# Patient Record
Sex: FEMALE | Race: WHITE | Employment: OTHER | RURAL
[De-identification: names, ages, dates, MRNs, and addresses within clinical notes are randomized per-mention and may not be internally consistent; named-entity substitution may affect disease eponyms.]

---

## 2017-03-17 ENCOUNTER — OFFICE VISIT (OUTPATIENT)
Dept: FAMILY MEDICINE CLINIC | Age: 67
End: 2017-03-17

## 2017-03-17 VITALS
HEIGHT: 63 IN | WEIGHT: 130 LBS | HEART RATE: 91 BPM | BODY MASS INDEX: 23.04 KG/M2 | OXYGEN SATURATION: 99 % | TEMPERATURE: 97.7 F | RESPIRATION RATE: 18 BRPM | DIASTOLIC BLOOD PRESSURE: 70 MMHG | SYSTOLIC BLOOD PRESSURE: 130 MMHG

## 2017-03-17 DIAGNOSIS — C50.919 METASTATIC BREAST CANCER (HCC): Primary | ICD-10-CM

## 2017-03-17 DIAGNOSIS — G89.3 CHRONIC PAIN DUE TO NEOPLASM: ICD-10-CM

## 2017-03-17 RX ORDER — MIRTAZAPINE 15 MG/1
TABLET, FILM COATED ORAL
COMMUNITY
End: 2017-08-04 | Stop reason: ALTCHOICE

## 2017-03-17 RX ORDER — OXYCODONE HYDROCHLORIDE 10 MG/1
TABLET ORAL
COMMUNITY
End: 2017-03-17 | Stop reason: SDUPTHER

## 2017-03-17 RX ORDER — GABAPENTIN 600 MG/1
TABLET ORAL
Qty: 90 TAB | Refills: 5 | Status: SHIPPED | OUTPATIENT
Start: 2017-03-17 | End: 2017-06-01 | Stop reason: SDUPTHER

## 2017-03-17 RX ORDER — GLUCOSAMINE SULFATE 1500 MG
POWDER IN PACKET (EA) ORAL DAILY
COMMUNITY

## 2017-03-17 RX ORDER — CALCIUM CARBONATE 500(1250)
TABLET ORAL DAILY
COMMUNITY

## 2017-03-17 RX ORDER — NAPROXEN 500 MG/1
TABLET ORAL
Qty: 60 TAB | Refills: 5 | Status: SHIPPED | OUTPATIENT
Start: 2017-03-17 | End: 2017-08-04 | Stop reason: SINTOL

## 2017-03-17 RX ORDER — OXYCODONE HYDROCHLORIDE 10 MG/1
10 TABLET ORAL
Qty: 30 TAB | Refills: 0 | Status: SHIPPED | OUTPATIENT
Start: 2017-03-17 | End: 2017-06-01 | Stop reason: SDUPTHER

## 2017-03-17 NOTE — PROGRESS NOTES
Ramon Conner is a 78 y.o. female who presents with the following:  Chief Complaint   Patient presents with    Pain (Chronic)     needs pain management since breast surgery       Back Pain    The history is provided by the patient (The patient has metastatic breast cancer from the right breast and to her bones and so far has spared her soft tissues. She is out of oxycodone and needs some to bridge until she gets a prescription from her doctor in [place]). The pain is present in the thoracic spine and lumbar spine. The quality of the pain is described as stabbing, shooting and aching (Unremitting). The pain is at a severity of 7/10. The pain is the same all the time. Pertinent negatives include no chest pain, no fever, no numbness, no weight loss, no headaches, no abdominal pain, no abdominal swelling, no bowel incontinence, no perianal numbness, no bladder incontinence, no dysuria, no pelvic pain, no leg pain, no paresthesias, no paresis, no tingling and no weakness. No Known Allergies    Current Outpatient Prescriptions   Medication Sig    calcium carbonate (OS-ANIKET) 500 mg calcium (1,250 mg) tablet Take  by mouth daily.  cholecalciferol (VITAMIN D3) 1,000 unit cap Take  by mouth daily.  mirtazapine (REMERON) 15 mg tablet Take  by mouth nightly.  oxyCODONE IR (ROXICODONE) 10 mg tab immediate release tablet Take 1 Tab by mouth every six (6) hours as needed. Max Daily Amount: 40 mg.    naproxen (NAPROSYN) 500 mg tablet Twice daily with meals  Indications: Pain    gabapentin (NEURONTIN) 600 mg tablet By mouth every 8 hours for pain  Indications: NEUROPATHIC PAIN    citalopram (CELEXA) 20 mg tablet Take 1 Tab by mouth daily. Indications: ANXIETY WITH DEPRESSION    diazepam (VALIUM) 10 mg tablet One po qhs  Indications: MUSCLE SPASM    methocarbamol (ROBAXIN) 750 mg tablet Take 1 Tab by mouth four (4) times daily. Indications:  MUSCLE SPASM    METHYL SALICYLATE/MENTHOL (ICY HOT EX) by Apply Externally route as needed.  METHYL SALICYLATE/MENTHOL (BENGAY EX) by Apply Externally route as needed.  NAPROXEN SODIUM (ALEVE PO) Take  by mouth as needed.  traMADol (ULTRAM) 50 mg tablet Take 1 Tab by mouth every six (6) hours as needed for Pain. Max Daily Amount: 200 mg. Indications: PAIN     No current facility-administered medications for this visit. Past Medical History:   Diagnosis Date    Depression     Dizziness     Gastritis     Insomnia     Joint pain     Urinary frequency     Weakness     arms and legs       Past Surgical History:   Procedure Laterality Date    HX KYPHOPLASTY         Family History   Problem Relation Age of Onset    Hypertension Father     Stroke Father        Social History     Social History    Marital status:      Spouse name: N/A    Number of children: N/A    Years of education: N/A     Social History Main Topics    Smoking status: Current Every Day Smoker     Packs/day: 0.25     Types: Cigarettes    Smokeless tobacco: Never Used    Alcohol use 1.2 - 3.0 oz/week     2 - 5 Standard drinks or equivalent per week    Drug use: No      Comment: hx of \"crack\" use [id number]    Sexual activity: Not Asked     Other Topics Concern    None     Social History Narrative       Review of Systems   Constitutional: Negative for fever and weight loss. Cardiovascular: Negative for chest pain. Gastrointestinal: Negative for abdominal pain and bowel incontinence. Genitourinary: Negative for bladder incontinence, dysuria and pelvic pain. Musculoskeletal: Positive for back pain. Neurological: Negative for tingling, weakness, numbness, headaches and paresthesias.        Visit Vitals    /70 (BP 1 Location: Left arm, BP Patient Position: Sitting)    Pulse 91    Temp 97.7 °F (36.5 °C)    Resp 18    Ht 5' 2.5\" (1.588 m)    Wt 130 lb (59 kg)    SpO2 99%    BMI 23.4 kg/m2     Physical Exam   Constitutional: She is oriented to person, place, and time. She appears distressed. The patient looks absolutely miserable   HENT:   Head: Normocephalic and atraumatic. Right Ear: External ear normal.   Left Ear: External ear normal.   Mouth/Throat: Oropharynx is clear and moist.   Eyes: Conjunctivae and EOM are normal. Pupils are equal, round, and reactive to light. Right eye exhibits no discharge. Left eye exhibits no discharge. Neck: Normal range of motion. Neck supple. No tracheal deviation present. No thyromegaly present. Cardiovascular: Normal rate, regular rhythm, normal heart sounds and intact distal pulses. Exam reveals no gallop and no friction rub. No murmur heard. Pulmonary/Chest: Effort normal and breath sounds normal. No respiratory distress. She has no wheezes. She exhibits no tenderness. The right breast has been totally consumed by the cancer and a mass can be felt in the spinal column at the lower thoracic area and upper lumbar. Abdominal: Soft. Bowel sounds are normal. She exhibits no distension and no mass. There is no tenderness. There is no rebound and no guarding. Musculoskeletal: She exhibits no edema or tenderness. Lymphadenopathy:     She has no cervical adenopathy. Neurological: She is alert and oriented to person, place, and time. She has normal reflexes. No cranial nerve deficit. She exhibits normal muscle tone. Gait normal. Coordination normal.   Skin: Skin is warm and dry. No rash noted. No erythema. No pallor. Psychiatric: Mood, memory, affect and judgment normal.         ICD-10-CM ICD-9-CM    1.  Metastatic breast cancer (HCC) C50.919 174.9 calcium carbonate (OS-ANIKET) 500 mg calcium (1,250 mg) tablet    C79.9 199.1 cholecalciferol (VITAMIN D3) 1,000 unit cap      mirtazapine (REMERON) 15 mg tablet      oxyCODONE IR (ROXICODONE) 10 mg tab immediate release tablet      naproxen (NAPROSYN) 500 mg tablet      gabapentin (NEURONTIN) 600 mg tablet      DISCONTINUED: oxyCODONE IR (ROXICODONE) 10 mg tab immediate release tablet   2. Chronic pain due to neoplasm G89.3 338.3 calcium carbonate (OS-ANIKET) 500 mg calcium (1,250 mg) tablet      cholecalciferol (VITAMIN D3) 1,000 unit cap      mirtazapine (REMERON) 15 mg tablet      oxyCODONE IR (ROXICODONE) 10 mg tab immediate release tablet      naproxen (NAPROSYN) 500 mg tablet      gabapentin (NEURONTIN) 600 mg tablet      DISCONTINUED: oxyCODONE IR (ROXICODONE) 10 mg tab immediate release tablet    the patient understands the oxycodone prescription is just to get her through until the permanent prescription arrives from her cancer doctor in [place]. I am absolutely surprised this lady is still alive considering the bulk of the original cancer diagnosis. I believe this patient is a relatively low risk for use of the oxycodone as she may use it every 6 hours but has been spacing it out to every 8 hours and is been without it for over a week. I have talked to her about the use of Naprosyn and gabapentin and how to gradually build up on the gabapentin to where it is not making her sleepy and to watch for gastrointestinal problems with the Naprosyn.  was checked. Orders Placed This Encounter    calcium carbonate (OS-ANIKET) 500 mg calcium (1,250 mg) tablet     Sig: Take  by mouth daily.  cholecalciferol (VITAMIN D3) 1,000 unit cap     Sig: Take  by mouth daily.  DISCONTD: oxyCODONE IR (ROXICODONE) 10 mg tab immediate release tablet     Sig: Take  by mouth.  mirtazapine (REMERON) 15 mg tablet     Sig: Take  by mouth nightly.  oxyCODONE IR (ROXICODONE) 10 mg tab immediate release tablet     Sig: Take 1 Tab by mouth every six (6) hours as needed. Max Daily Amount: 40 mg.      Dispense:  30 Tab     Refill:  0    naproxen (NAPROSYN) 500 mg tablet     Sig: Twice daily with meals  Indications: Pain     Dispense:  60 Tab     Refill:  5    gabapentin (NEURONTIN) 600 mg tablet     Sig: By mouth every 8 hours for pain  Indications: NEUROPATHIC PAIN     Dispense:  90 Tab Refill:  5       Follow-up Disposition: Not on Shaye Feliz MD

## 2017-03-17 NOTE — MR AVS SNAPSHOT
Visit Information Date & Time Provider Department Dept. Phone Encounter #  
 3/17/2017  9:20 AM Jonathon Remy MD [facility] [phone] [id number] Upcoming Health Maintenance Date Due Hepatitis C Screening 1950 DTaP/Tdap/Td series (1 - Tdap) 11/16/1970 FOBT Q 1 YEAR AGE 50-75 11/16/1999 ZOSTER VACCINE AGE 60> 11/16/2009 GLAUCOMA SCREENING Q2Y 11/16/2014 OSTEOPOROSIS SCREENING (DEXA) 11/16/2014 Pneumococcal 65+ High/Highest Risk (1 of 2 - PCV13) 11/16/2014 MEDICARE YEARLY EXAM 11/16/2014 INFLUENZA AGE 9 TO ADULT 8/1/2016 BREAST CANCER SCRN MAMMOGRAM 3/30/2018 Allergies as of 3/17/2017  Review Complete On: 3/17/2017 By: Jonathon Remy MD  
 No Known Allergies Current Immunizations  Never Reviewed No immunizations on file. Not reviewed this visit You Were Diagnosed With   
  
 Codes Comments [facility]    -  Primary ICD-10-CM: C50.919, C79.9 ICD-9-CM: 174.9, 199.1 Chronic pain due to neoplasm     ICD-10-CM: G89.3 ICD-9-CM: 338. 3 Vitals BP Pulse Temp Resp Height(growth percentile) Weight(growth percentile) 130/70 (BP 1 Location: Left arm, BP Patient Position: Sitting) 91 97.7 °F (36.5 °C) 18 5' 2.5\" (1.588 m) 130 lb (59 kg) SpO2 BMI OB Status Smoking Status 99% 23.4 kg/m2 Postmenopausal Current Every Day Smoker Vitals History BMI and BSA Data Body Mass Index Body Surface Area  
 23.4 kg/m 2 1.61 m 2 Preferred Pharmacy Pharmacy Name Phone [facility] [place] [address], [place] - [address] [phone] Your Updated Medication List  
  
   
This list is accurate as of: 3/17/17 11:26 AM.  Always use your most recent med list.  
  
  
  
  
 Zygmunt Barreto Take  by mouth as needed. calcium carbonate 500 mg calcium (1,250 mg) tablet Commonly known as:  OS-ANIKET Take  by mouth daily. citalopram 20 mg tablet Commonly known as:  Lovetta Rape Take 1 Tab by mouth daily. Indications: ANXIETY WITH DEPRESSION  
  
 diazePAM 10 mg tablet Commonly known as:  VALIUM One po qhs  Indications: MUSCLE SPASM  
  
 gabapentin 600 mg tablet Commonly known as:  NEURONTIN By mouth every 8 hours for pain  Indications: NEUROPATHIC PAIN  
  
 * ICY HOT EX  
by Apply Externally route as needed. * BENGAY EX  
by Apply Externally route as needed. methocarbamol 750 mg tablet Commonly known as:  ROBAXIN Take 1 Tab by mouth four (4) times daily. Indications: MUSCLE SPASM  
  
 mirtazapine 15 mg tablet Commonly known as:  [place] Hoop Take  by mouth nightly. naproxen 500 mg tablet Commonly known as:  NAPROSYN Twice daily with meals  Indications: Pain  
  
 oxyCODONE IR 10 mg Tab immediate release tablet Commonly known as:  [place] Take 1 Tab by mouth every six (6) hours as needed. Max Daily Amount: 40 mg.  
  
 traMADol 50 mg tablet Commonly known as:  ULTRAM  
Take 1 Tab by mouth every six (6) hours as needed for Pain. Max Daily Amount: 200 mg. Indications: PAIN  
  
 VITAMIN D3 1,000 unit Cap Generic drug:  cholecalciferol Take  by mouth daily. * Notice: This list has 2 medication(s) that are the same as other medications prescribed for you. Read the directions carefully, and ask your doctor or other care provider to review them with you. Prescriptions Printed Refills  
 oxyCODONE IR (ROXICODONE) 10 mg tab immediate release tablet 0 Sig: Take 1 Tab by mouth every six (6) hours as needed. Max Daily Amount: 40 mg.  
 Class: Print Route: Oral  
  
Prescriptions Sent to Pharmacy Refills  
 naproxen (NAPROSYN) 500 mg tablet 5 Sig: Twice daily with meals  Indications: Pain  Class: Normal  
 Pharmacy: [address] Ph #: [phone]  
 gabapentin (NEURONTIN) 600 mg tablet 5  
 Sig: By mouth every 8 hours for pain  Indications: NEUROPATHIC PAIN Class: Normal  
 Pharmacy: [facility] [address]  #: [phone] Introducing [facility] & HEALTH SERVICES! Annmarie Ward introduces ENDOGENX patient portal. Now you can access parts of your medical record, email your doctor's office, and request medication refills online. 1. In your internet browser, go to [email or web address] 2. Click on the First Time User? Click Here link in the Sign In box. You will see the New Member Sign Up page. 3. Enter your ENDOGENX Access Code exactly as it appears below. You will not need to use this code after youve completed the sign-up process. If you do not sign up before the expiration date, you must request a new code. · ENDOGENX Access Code: [email or web address] Expires: 6/15/2017 11:26 AM 
 
4. Enter the last four digits of your Social Security Number (xxxx) and Date of Birth (mm/dd/yyyy) as indicated and click Submit. You will be taken to the next sign-up page. 5. Create a ENDOGENX ID. This will be your ENDOGENX login ID and cannot be changed, so think of one that is secure and easy to remember. 6. Create a ENDOGENX password. You can change your password at any time. 7. Enter your Password Reset Question and Answer. This can be used at a later time if you forget your password. 8. Enter your e-mail address. You will receive e-mail notification when new information is available in [address] 9. Click Sign Up. You can now view and download portions of your medical record. 10. Click the Download Summary menu link to download a portable copy of your medical information. If you have questions, please visit the Frequently Asked Questions section of the ENDOGENX website. Remember, ENDOGENX is NOT to be used for urgent needs. For medical emergencies, dial 911. Now available from your iPhone and Android! Please provide this summary of care documentation to your next provider. Your primary care clinician is listed as Janel Mays. If you have any questions after today's visit, please call [phone].

## 2017-03-17 NOTE — PROGRESS NOTES
Prescriptions cancelled at [address] [facility] and called into [facility] per patient request.

## 2017-04-27 ENCOUNTER — TELEPHONE (OUTPATIENT)
Dept: FAMILY MEDICINE CLINIC | Age: 67
End: 2017-04-27

## 2017-05-30 DIAGNOSIS — G89.3 CHRONIC PAIN DUE TO NEOPLASM: ICD-10-CM

## 2017-05-30 DIAGNOSIS — C50.919 METASTATIC BREAST CANCER (HCC): ICD-10-CM

## 2017-05-30 RX ORDER — OXYCODONE HYDROCHLORIDE 10 MG/1
10 TABLET ORAL
Qty: 30 TAB | Refills: 0 | OUTPATIENT
Start: 2017-05-30

## 2017-05-30 NOTE — TELEPHONE ENCOUNTER
Put has been unable to go back to [facility] because she was sick, has not set up a new appt with them yet. Wants a refill on oxycodone.

## 2017-06-01 ENCOUNTER — OFFICE VISIT (OUTPATIENT)
Dept: FAMILY MEDICINE CLINIC | Age: 67
End: 2017-06-01

## 2017-06-01 VITALS
DIASTOLIC BLOOD PRESSURE: 94 MMHG | HEART RATE: 94 BPM | BODY MASS INDEX: 23.39 KG/M2 | WEIGHT: 132 LBS | SYSTOLIC BLOOD PRESSURE: 152 MMHG | TEMPERATURE: 97.6 F | OXYGEN SATURATION: 98 % | RESPIRATION RATE: 18 BRPM | HEIGHT: 63 IN

## 2017-06-01 DIAGNOSIS — C50.919 METASTATIC BREAST CANCER (HCC): ICD-10-CM

## 2017-06-01 DIAGNOSIS — Z00.00 ROUTINE GENERAL MEDICAL EXAMINATION AT A HEALTH CARE FACILITY: Primary | ICD-10-CM

## 2017-06-01 DIAGNOSIS — F32.A ANXIETY AND DEPRESSION: ICD-10-CM

## 2017-06-01 DIAGNOSIS — F41.9 ANXIETY AND DEPRESSION: ICD-10-CM

## 2017-06-01 DIAGNOSIS — G89.3 CHRONIC PAIN DUE TO NEOPLASM: ICD-10-CM

## 2017-06-01 DIAGNOSIS — Z13.39 SCREENING FOR ALCOHOLISM: ICD-10-CM

## 2017-06-01 RX ORDER — GABAPENTIN 600 MG/1
TABLET ORAL
Qty: 90 TAB | Refills: 5 | Status: SHIPPED | OUTPATIENT
Start: 2017-06-01 | End: 2018-06-01 | Stop reason: SDUPTHER

## 2017-06-01 RX ORDER — LETROZOLE 2.5 MG/1
2.5 TABLET, FILM COATED ORAL DAILY
COMMUNITY

## 2017-06-01 RX ORDER — OXYCODONE HYDROCHLORIDE 10 MG/1
10 TABLET ORAL
Qty: 60 TAB | Refills: 0 | Status: SHIPPED | OUTPATIENT
Start: 2017-06-01 | End: 2017-06-30 | Stop reason: SDUPTHER

## 2017-06-01 NOTE — ACP (ADVANCE CARE PLANNING)
Advance Care Planning 6/1/2017   Confirm Advance Directive None   Patient Would Like to Complete Advance Directive Yes     Discussed ACP planning with patient and asked that they take form and info home and return with completed form at next visit. Patient states understanding and form with Q and A booklet given to patient.

## 2017-06-01 NOTE — MR AVS SNAPSHOT
Visit Information Date & Time Provider Department Dept. Phone Encounter #  
 6/1/2017 11:20 AM Maryann Watts MD [facility] [phone] [id number] Upcoming Health Maintenance Date Due Hepatitis C Screening 1950 FOBT Q 1 YEAR AGE 50-75 11/16/1999 GLAUCOMA SCREENING Q2Y 11/16/2014 Pneumococcal 65+ High/Highest Risk (1 of 2 - PCV13) 11/16/2014 INFLUENZA AGE 9 TO ADULT 8/1/2017 BREAST CANCER SCRN MAMMOGRAM 3/30/2018 MEDICARE YEARLY EXAM 6/2/2018 DTaP/Tdap/Td series (2 - Td) 6/1/2027 Allergies as of 6/1/2017  Review Complete On: 6/1/2017 By: Maryann Watts MD  
 No Known Allergies Current Immunizations  Never Reviewed No immunizations on file. Not reviewed this visit You Were Diagnosed With   
  
 Codes Comments Routine general medical examination at a health care facility     ICD-10-CM: Z00.00 ICD-9-CM: V70.0 Screening for alcoholism     ICD-10-CM: Z13.89 ICD-9-CM: V79.1 Metastatic breast cancer ([facility]     ICD-10-CM: C50.919, C79.9 ICD-9-CM: 174.9, 199.1 Chronic pain due to neoplasm     ICD-10-CM: G89.3 ICD-9-CM: 338. 3 Vitals BP Pulse Temp Resp Height(growth percentile) Weight(growth percentile) (!) 152/94 (BP 1 Location: Left arm, BP Patient Position: Sitting) 94 97.6 °F (36.4 °C) (Oral) 18 5' 2.5\" (1.588 m) 132 lb (59.9 kg) SpO2 BMI OB Status Smoking Status 98% 23.76 kg/m2 Postmenopausal Current Every Day Smoker Vitals History BMI and BSA Data Body Mass Index Body Surface Area  
 23.76 kg/m 2 1.63 m 2 Preferred Pharmacy Pharmacy Name Phone [address] AT [facility]  [address] [place] [phone] Your Updated Medication List  
  
   
This list is accurate as of: 6/1/17 12:31 PM.  Always use your most recent med list.  
  
  
  
  
 [place]is Rock Take  by mouth as needed. calcium carbonate 500 mg calcium (1,250 mg) tablet Commonly known as:  OS-ANIKET Take  by mouth daily. gabapentin 600 mg tablet Commonly known as:  NEURONTIN By mouth every 8 hours for pain  Indications: NEUROPATHIC PAIN  
  
 mirtazapine 15 mg tablet Commonly known as:  Agueda Ken Take  by mouth nightly. naproxen 500 mg tablet Commonly known as:  NAPROSYN Twice daily with meals  Indications: Pain  
  
 oxyCODONE IR 10 mg Tab immediate release tablet Commonly known as:  Yuli Neighbor Take 1 Tab by mouth every six (6) hours as needed. Max Daily Amount: 40 mg. VITAMIN D3 1,000 unit Cap Generic drug:  cholecalciferol Take  by mouth daily. Prescriptions Printed Refills  
 oxyCODONE IR (ROXICODONE) 10 mg tab immediate release tablet 0 Sig: Take 1 Tab by mouth every six (6) hours as needed. Max Daily Amount: 40 mg.  
 Class: Print Route: Oral  
  
Prescriptions Sent to Pharmacy Refills  
 gabapentin (NEURONTIN) 600 mg tablet 5 Sig: By mouth every 8 hours for pain  Indications: NEUROPATHIC PAIN Class: Normal  
 [facility] [place] [address] Ph #: [phone] We Performed the Following REFERRAL TO OPHTHALMOLOGY [REF57 Custom] Referral Information Referral ID Referred By Referred To  
  
 [id number] Delmi Vee, [facility], [facility]   
   [id number] Visits Status Start Date End Date 1 New Request 6/1/17 6/1/18 If your referral has a status of pending review or denied, additional information will be sent to support the outcome of this decision. Introducing [facility] & HEALTH SERVICES! [facility] introduces [employer] patient portal. Now you can access parts of your medical record, email your doctor's office, and request medication refills online.    
 
1. In your internet browser, go to [email or web address] 2. Click on the First Time User? Click Here link in the Sign In box. You will see the New Member Sign Up page. 3. Enter your Myows Access Code exactly as it appears below. You will not need to use this code after youve completed the sign-up process. If you do not sign up before the expiration date, you must request a new code. · Myows Access Code: [email or web address] Expires: 6/15/2017 11:26 AM 
 
4. Enter the last four digits of your Social Security Number (xxxx) and Date of Birth (mm/dd/yyyy) as indicated and click Submit. You will be taken to the next sign-up page. 5. Create a Myows ID. This will be your Myows login ID and cannot be changed, so think of one that is secure and easy to remember. 6. Create a Myows password. You can change your password at any time. 7. Enter your Password Reset Question and Answer. This can be used at a later time if you forget your password. 8. Enter your e-mail address. You will receive e-mail notification when new information is available in [address] 9. Click Sign Up. You can now view and download portions of your medical record. 10. Click the Download Summary menu link to download a portable copy of your medical information. If you have questions, please visit the Frequently Asked Questions section of the Myows website. Remember, Myows is NOT to be used for urgent needs. For medical emergencies, dial 911. Now available from your iPhone and Android! Please provide this summary of care documentation to your next provider. Your primary care clinician is listed as Adrian Shepard. If you have any questions after today's visit, please call [phone].

## 2017-06-02 ENCOUNTER — DOCUMENTATION ONLY (OUTPATIENT)
Dept: FAMILY MEDICINE CLINIC | Age: 67
End: 2017-06-02

## 2017-06-30 DIAGNOSIS — G89.3 CHRONIC PAIN DUE TO NEOPLASM: ICD-10-CM

## 2017-06-30 DIAGNOSIS — C50.919 METASTATIC BREAST CANCER (HCC): ICD-10-CM

## 2017-06-30 RX ORDER — OXYCODONE HYDROCHLORIDE 10 MG/1
10 TABLET ORAL
Qty: 60 TAB | Refills: 0 | Status: SHIPPED | OUTPATIENT
Start: 2017-06-30 | End: 2017-08-04 | Stop reason: SDUPTHER

## 2017-08-04 ENCOUNTER — OFFICE VISIT (OUTPATIENT)
Dept: FAMILY MEDICINE CLINIC | Age: 67
End: 2017-08-04

## 2017-08-04 VITALS
WEIGHT: 144 LBS | HEIGHT: 63 IN | BODY MASS INDEX: 25.52 KG/M2 | HEART RATE: 70 BPM | TEMPERATURE: 96.8 F | OXYGEN SATURATION: 98 % | DIASTOLIC BLOOD PRESSURE: 68 MMHG | SYSTOLIC BLOOD PRESSURE: 153 MMHG

## 2017-08-04 DIAGNOSIS — C50.919 METASTATIC BREAST CANCER (HCC): ICD-10-CM

## 2017-08-04 DIAGNOSIS — G89.3 CHRONIC PAIN DUE TO NEOPLASM: ICD-10-CM

## 2017-08-04 RX ORDER — OXYCODONE HYDROCHLORIDE 10 MG/1
10 TABLET ORAL
Qty: 60 TAB | Refills: 0 | Status: SHIPPED | OUTPATIENT
Start: 2017-08-04

## 2017-08-04 NOTE — MR AVS SNAPSHOT
Visit Information Date & Time Provider Department Dept. Phone Encounter #  
 8/4/2017  2:20 PM Mehdi Rojas MD [facility] BEHAVIORAL MEDICINE Primary Care [phone] [id number] Your Appointments 9/6/2017  9:20 AM  
Follow Up with Mehdi Rojas MD  
[facility] BE[facility]AVIORAL MEDICINE Primary Care [address] [facility]) Appt Note: 3 mo f/u  
 [address] [facility] [address] [phone]  
  
   
 [facility] 67 [phone] Upcoming Health Maintenance Date Due Hepatitis C Screening 1950 FOBT Q 1 YEAR AGE 50-75 11/16/1999 GLAUCOMA SCREENING Q2Y 11/16/2014 Pneumococcal 65+ High/Highest Risk (1 of 2 - PCV13) 11/16/2014 INFLUENZA AGE 9 TO ADULT 8/1/2017 BREAST CANCER SCRN MAMMOGRAM 3/30/2018 MEDICARE YEARLY EXAM 6/2/2018 DTaP/Tdap/Td series (2 - Td) 6/1/2027 Allergies as of 8/4/2017  Review Complete On: 8/4/2017 By: Mehdi Rojas MD  
 No Known Allergies Current Immunizations  Never Reviewed No immunizations on file. Not reviewed this visit You Were Diagnosed With   
  
 Codes Comments Metastatic breast cancer ([facility] [address]     ICD-10-CM: C50.919, C79.9 ICD-9-CM: 174.9, 199.1 Chronic pain due to neoplasm     ICD-10-CM: G89.3 ICD-9-CM: 338. 3 Vitals BP Pulse Temp Height(growth percentile) Weight(growth percentile) SpO2  
 153/68 (BP 1 Location: Left arm, BP Patient Position: Sitting) 70 96.8 °F (36 °C) (Oral) 5' 2.5\" (1.588 m) 144 lb (65.3 kg) 98% BMI OB Status Smoking Status 25.92 kg/m2 Postmenopausal Current Every Day Smoker Vitals History BMI and BSA Data Body Mass Index Body Surface Area  
 25.92 kg/m 2 1.7 m 2 Preferred Pharmacy Pharmacy Name Phone [address], [address] AT [facility] OF  [address] [phone] Your Updated Medication List  
  
   
 This list is accurate as of: 8/4/17  2:29 PM.  Always use your most recent med list.  
  
  
  
  
 Sole Mckeon Take  by mouth as needed. calcium carbonate 500 mg calcium (1,250 mg) tablet Commonly known as:  OS-ANIKET Take  by mouth daily. gabapentin 600 mg tablet Commonly known as:  NEURONTIN By mouth every 8 hours for pain  Indications: NEUROPATHIC PAIN  
  
 IBRANCE 125 mg Cap Generic drug:  palbociclib Take 125 mg by mouth. Indications: Take one everyday for 21 days then off for 7 days  
  
 letrozole 2.5 mg tablet Commonly known as:  [facility] Take 2.5 mg by mouth daily. oxyCODONE IR 10 mg Tab immediate release tablet Commonly known as:  Joana Parody Take 1 Tab by mouth every six (6) hours as needed. Max Daily Amount: 40 mg. VITAMIN D3 1,000 unit Cap Generic drug:  cholecalciferol Take  by mouth daily. Prescriptions Printed Refills  
 oxyCODONE IR (ROXICODONE) 10 mg tab immediate release tablet 0 Sig: Take 1 Tab by mouth every six (6) hours as needed. Max Daily Amount: 40 mg.  
 Class: Print Route: Oral  
  
Introducing [address]! [employer] introduces [employer] patient portal. Now you can access parts of your medical record, email your doctor's office, and request medication refills online. 1. In your internet browser, go to [email or web address] 2. Click on the First Time User? Click Here link in the Sign In box. You will see the New Member Sign Up page. 3. Enter your [employer]icket[employer] Access Code exactly as it appears below. You will not need to use this code after youve completed the sign-up process. If you do not sign up before the expiration date, you must request a new code. · [employer]icketForEvent Access Code: [email or web address] Expires: 11/2/2017  2:06 PM 
 
4. Enter the last four digits of your Social Security Number (xxxx) and Date of Birth (mm/dd/yyyy) as indicated and click Submit.  You will be taken to the next sign-up page. 5. Create a [employer] ID. This will be your [employer] login ID and cannot be changed, so think of one that is secure and easy to remember. 6. Create a [employer] password. You can change your password at any time. 7. Enter your Password Reset Question and Answer. This can be used at a later time if you forget your password. 8. Enter your e-mail address. You will receive e-mail notification when new information is available in [address] 9. Click Sign Up. You can now view and download portions of your medical record. 10. Click the Download Summary menu link to download a portable copy of your medical information. If you have questions, please visit the Frequently Asked Questions section of the [employer] website. Remember, [employer] is NOT to be used for urgent needs. For medical emergencies, dial 911. Now available from your iPhone and Android! Please provide this summary of care documentation to your next provider. Your primary care clinician is listed as Kim Bingham. If you have any questions after today's visit, please call [phone].

## 2017-08-04 NOTE — PROGRESS NOTES
Jonas Draper is a 78 y.o. female who presents with the following:  Chief Complaint   Patient presents with    Pain (Chronic)     breast cancer       Side Pain   The history is provided by the patient (Patient with persisting pain from her metastatic breast cancer needs a refill of her oxycodone currently averaging 2 per day without any constipation and is staying physically active but complains of gaining weight at this point in time. ). Pertinent negatives include no chest pain, no abdominal pain, no headaches and no shortness of breath. Hypertension    The history is provided by the patient (The patient's blood pressure is tended to go up with her weight gain and we have discussed sodium restriction and trying to drop a little bit of the weight to get her pressure down. ). Pertinent negatives include no chest pain, no orthopnea, no palpitations, no PND, no blurred vision, no headaches, no peripheral edema, no dizziness and no shortness of breath. No Known Allergies    Current Outpatient Prescriptions   Medication Sig    oxyCODONE IR (ROXICODONE) 10 mg tab immediate release tablet Take 1 Tab by mouth every six (6) hours as needed. Max Daily Amount: 40 mg.    gabapentin (NEURONTIN) 600 mg tablet By mouth every 8 hours for pain  Indications: NEUROPATHIC PAIN    letrozole (FEMARA) 2.5 mg tablet Take 2.5 mg by mouth daily.  palbociclib (IBRANCE) 125 mg cap Take 125 mg by mouth. Indications: Take one everyday for 21 days then off for 7 days    calcium carbonate (OS-ANIKET) 500 mg calcium (1,250 mg) tablet Take  by mouth daily.  cholecalciferol (VITAMIN D3) 1,000 unit cap Take  by mouth daily.  NAPROXEN SODIUM (ALEVE PO) Take  by mouth as needed. No current facility-administered medications for this visit.         Past Medical History:   Diagnosis Date    Depression     Dizziness     Gastritis     Insomnia     Joint pain     Urinary frequency     Weakness     arms and legs       Past Surgical History:   Procedure Laterality Date    HX KYPHOPLASTY         Family History   Problem Relation Age of Onset    Hypertension Father     Stroke Father        Social History     Social History    Marital status:      Spouse name: N/A    Number of children: N/A    Years of education: N/A     Social History Main Topics    Smoking status: Current Every Day Smoker     Packs/day: 0.25     Types: Cigarettes    Smokeless tobacco: Never Used    Alcohol use 1.2 - 3.0 oz/week     2 - 5 Standard drinks or equivalent per week    Drug use: No      Comment: hx of \"crack\" use [id number]    Sexual activity: Not Asked     Other Topics Concern    None     Social History Narrative       Review of Systems   Eyes: Negative for blurred vision. Respiratory: Negative for shortness of breath. Cardiovascular: Negative for chest pain, palpitations, orthopnea and PND. Gastrointestinal: Negative for abdominal pain. Neurological: Negative for dizziness and headaches. Visit Vitals    /68 (BP 1 Location: Left arm, BP Patient Position: Sitting)    Pulse 70    Temp 96.8 °F (36 °C) (Oral)    Ht 5' 2.5\" (1.588 m)    Wt 144 lb (65.3 kg)    SpO2 98%    BMI 25.92 kg/m2     Physical Exam   Constitutional: She is oriented to person, place, and time and well-developed, well-nourished, and in no distress. HENT:   Head: Normocephalic and atraumatic. Right Ear: External ear normal.   Left Ear: External ear normal.   Mouth/Throat: Oropharynx is clear and moist.   Eyes: Conjunctivae and EOM are normal. Pupils are equal, round, and reactive to light. Right eye exhibits no discharge. Left eye exhibits no discharge. Neck: Normal range of motion. Neck supple. No tracheal deviation present. No thyromegaly present. Cardiovascular: Normal rate, regular rhythm, normal heart sounds and intact distal pulses. Exam reveals no gallop and no friction rub. No murmur heard.   Pulmonary/Chest: Effort normal and breath sounds normal. No respiratory distress. She has no wheezes. She exhibits no tenderness. The breast cancer in the right breast is pretty much the same right now but not tender and not ulcerating. Abdominal: Soft. Bowel sounds are normal. She exhibits no distension and no mass. There is no tenderness. There is no rebound and no guarding. Musculoskeletal: She exhibits no edema or tenderness. Lymphadenopathy:     She has no cervical adenopathy. Neurological: She is alert and oriented to person, place, and time. She has normal reflexes. No cranial nerve deficit. She exhibits normal muscle tone. Gait normal. Coordination normal.   Skin: Skin is warm and dry. No rash noted. No erythema. No pallor. Psychiatric: Mood, memory, affect and judgment normal.         ICD-10-CM ICD-9-CM    1. Metastatic breast cancer (HCC) C50.919 174.9 oxyCODONE IR (ROXICODONE) 10 mg tab immediate release tablet    C79.9 199.1    2. Chronic pain due to neoplasm G89.3 338.3 oxyCODONE IR (ROXICODONE) 10 mg tab immediate release tablet       Orders Placed This Encounter    oxyCODONE IR (ROXICODONE) 10 mg tab immediate release tablet     Sig: Take 1 Tab by mouth every six (6) hours as needed. Max Daily Amount: 40 mg.      Dispense:  60 Tab     Refill:  0       Follow-up Disposition: Not on Prudy MD Ashok

## 2017-09-05 ENCOUNTER — TELEPHONE (OUTPATIENT)
Dept: FAMILY MEDICINE CLINIC | Age: 67
End: 2017-09-05

## 2017-09-05 NOTE — TELEPHONE ENCOUNTER
Patient called she is seeing her Oncologist at [facility] right now and Dr. Francisco Javier Husainpool and she told the patient that if ok with her PCP she would write the rx for oxycodone while she is in the office. Spoke with Dr. Enrique Downey and he stated that was fine, called pt back and made her aware.

## 2017-09-26 ENCOUNTER — TELEPHONE (OUTPATIENT)
Dept: FAMILY MEDICINE CLINIC | Age: 67
End: 2017-09-26

## 2017-09-26 NOTE — TELEPHONE ENCOUNTER
That is okay with me but asked the patient to have Dr. Jasson Thomas give us a note that she filled it.

## 2017-09-26 NOTE — TELEPHONE ENCOUNTER
Pt is having issues with transportation. She went to [place] yesterday and had bone scan done. She is to have her CT on 10/10. Patient has appointment with her Oncologist (Dr. Aubrie Oviedo) on 10/03 and would like to know if she can refill her Oxycodone then. Dr. Dione Nettles filled it for her last month.

## 2018-06-01 DIAGNOSIS — C50.919 METASTATIC BREAST CANCER (HCC): ICD-10-CM

## 2018-06-01 DIAGNOSIS — G89.3 CHRONIC PAIN DUE TO NEOPLASM: ICD-10-CM

## 2018-06-01 RX ORDER — GABAPENTIN 600 MG/1
TABLET ORAL
Qty: 90 TAB | Refills: 0 | Status: SHIPPED | OUTPATIENT
Start: 2018-06-01 | End: 2018-07-01 | Stop reason: SDUPTHER

## 2018-07-01 DIAGNOSIS — G89.3 CHRONIC PAIN DUE TO NEOPLASM: ICD-10-CM

## 2018-07-01 DIAGNOSIS — C50.919 METASTATIC BREAST CANCER (HCC): ICD-10-CM

## 2018-07-02 RX ORDER — GABAPENTIN 600 MG/1
TABLET ORAL
Qty: 90 TAB | Refills: 0 | Status: SHIPPED | OUTPATIENT
Start: 2018-07-02 | End: 2018-07-29 | Stop reason: SDUPTHER

## 2018-07-02 NOTE — TELEPHONE ENCOUNTER
Spoke with patient after verifying Name, and , patient aware of prescription refill and needing to schedule appointment. Patient scheduling appointment at this time.

## 2018-07-02 NOTE — TELEPHONE ENCOUNTER
Please call Ms. and uterus and asked her to make an appointment to be seen since it has been about a year and she is requesting refills which I will do for 1 month.

## 2018-07-29 DIAGNOSIS — G89.3 CHRONIC PAIN DUE TO NEOPLASM: ICD-10-CM

## 2018-07-29 DIAGNOSIS — C50.919 METASTATIC BREAST CANCER (HCC): ICD-10-CM

## 2018-07-30 RX ORDER — GABAPENTIN 600 MG/1
TABLET ORAL
Qty: 90 TAB | Refills: 0 | Status: SHIPPED | OUTPATIENT
Start: 2018-07-30 | End: 2018-08-26 | Stop reason: SDUPTHER

## 2018-08-26 DIAGNOSIS — C50.919 METASTATIC BREAST CANCER (HCC): ICD-10-CM

## 2018-08-26 DIAGNOSIS — G89.3 CHRONIC PAIN DUE TO NEOPLASM: ICD-10-CM

## 2018-08-28 RX ORDER — GABAPENTIN 600 MG/1
TABLET ORAL
Qty: 90 TAB | Refills: 0 | Status: SHIPPED | OUTPATIENT
Start: 2018-08-28 | End: 2018-09-27 | Stop reason: SDUPTHER

## 2018-09-27 DIAGNOSIS — G89.3 CHRONIC PAIN DUE TO NEOPLASM: ICD-10-CM

## 2018-09-27 DIAGNOSIS — C50.919 METASTATIC BREAST CANCER (HCC): ICD-10-CM

## 2018-09-27 RX ORDER — GABAPENTIN 600 MG/1
TABLET ORAL
Qty: 90 TAB | Refills: 0 | Status: SHIPPED | OUTPATIENT
Start: 2018-09-27 | End: 2018-11-23 | Stop reason: SDUPTHER

## 2018-11-23 DIAGNOSIS — C50.919 METASTATIC BREAST CANCER (HCC): ICD-10-CM

## 2018-11-23 DIAGNOSIS — G89.3 CHRONIC PAIN DUE TO NEOPLASM: ICD-10-CM

## 2018-11-23 RX ORDER — GABAPENTIN 600 MG/1
TABLET ORAL
Qty: 90 TAB | Refills: 0 | Status: SHIPPED | OUTPATIENT
Start: 2018-11-23 | End: 2018-12-24 | Stop reason: SDUPTHER

## 2018-12-24 DIAGNOSIS — C50.919 METASTATIC BREAST CANCER (HCC): ICD-10-CM

## 2018-12-24 DIAGNOSIS — G89.3 CHRONIC PAIN DUE TO NEOPLASM: ICD-10-CM

## 2018-12-26 RX ORDER — GABAPENTIN 600 MG/1
TABLET ORAL
Qty: 90 TAB | Refills: 0 | Status: SHIPPED | OUTPATIENT
Start: 2018-12-26

## 2018-12-27 DIAGNOSIS — C50.919 METASTATIC BREAST CANCER (HCC): ICD-10-CM

## 2018-12-27 DIAGNOSIS — G89.3 CHRONIC PAIN DUE TO NEOPLASM: ICD-10-CM

## 2018-12-28 RX ORDER — GABAPENTIN 600 MG/1
TABLET ORAL
Qty: 90 TAB | Refills: 0 | Status: SHIPPED | OUTPATIENT
Start: 2018-12-28

## 2024-08-26 NOTE — PROGRESS NOTES
Pt. With emesis X1, reports feels better after.    This is an Initial Medicare Annual Wellness Exam (AWV) (Performed 12 months after IPPE or effective date of Medicare Part B enrollment, Once in a lifetime)    I have reviewed the patient's medical history in detail and updated the computerized patient record. History   The patient reports increased anxiety and stress related to her metastatic breast cancer. The patient is being treated with letrozole 2.5 mg grams daily and I branch IBRANCE 125 mg daily for 21 days on and 7 days off. She is currently being treated with Remeron 15 mg nightly for her depression and anxiety. The patient states she is out of her oxycodone and has not been able to arrange a ride back to [place] to see the oncology department at [facility]. The patient normally takes this medicine one in the morning and 1 at night. She states during the day her pain levels are generally a 3-4 out of 10 and at nighttime go up to 8 out of 10. Her  was checked and found to be appropriate. The patient is also out of her gabapentin and was taking this every 8 hours for pain especially during the daytime as she was trying to avoid using the oxycodone to keep it effective. The patient is no longer taking Valium nor methocarbamol nor citalopram.  She is also off of her tramadol entirely. The kyphoplasty has helped her pain considerably but I have still had no report from [facility] on any of her other metastatic workup and really do not want to repeat anything that has been done there. We are currently seeking to get records from [facility].   Past Medical History:   Diagnosis Date    Depression     Dizziness     Gastritis     Insomnia     Joint pain     Urinary frequency     Weakness     arms and legs      Past Surgical History:   Procedure Laterality Date    HX KYPHOPLASTY       Current Outpatient Prescriptions   Medication Sig Dispense Refill    oxyCODONE IR (ROXICODONE) 10 mg tab immediate release tablet Take 1 Tab by mouth every six (6) hours as needed. Max Daily Amount: 40 mg. 60 Tab 0    letrozole (FEMARA) 2.5 mg tablet Take 2.5 mg by mouth daily.  palbociclib (IBRANCE) 125 mg cap Take 125 mg by mouth. Indications: Take one everyday for 21 days then off for 7 days      calcium carbonate (OS-ANIKET) 500 mg calcium (1,250 mg) tablet Take  by mouth daily.  cholecalciferol (VITAMIN D3) 1,000 unit cap Take  by mouth daily.  mirtazapine (REMERON) 15 mg tablet Take  by mouth nightly.  naproxen (NAPROSYN) 500 mg tablet Twice daily with meals  Indications: Pain 60 Tab 5    NAPROXEN SODIUM (ALEVE PO) Take  by mouth as needed.  gabapentin (NEURONTIN) 600 mg tablet By mouth every 8 hours for pain  Indications: NEUROPATHIC PAIN 90 Tab 5     No Known Allergies  Family History   Problem Relation Age of Onset    Hypertension Father     Stroke Father      Social History   Substance Use Topics    Smoking status: Current Every Day Smoker     Packs/day: 0.25     Types: Cigarettes    Smokeless tobacco: Never Used    Alcohol use 1.2 - 3.0 oz/week     2 - 5 Standard drinks or equivalent per week     Patient Active Problem List   Diagnosis Code    Spine pain, cervical M54.2    Midline low back pain without sciatica M54.5    Right breast cancer with T3 tumor, >5 cm in greatest dimension (HCC) C50.911    Metastatic breast cancer (HCC) C50.919, C79.9    Chronic pain due to neoplasm G89.3         Depression Risk Factor Screening:     PHQ over the last two weeks 3/30/2016   Little interest or pleasure in doing things Not at all   Feeling down, depressed or hopeless Not at all   Total Score PHQ 2 0     Alcohol Risk Factor Screening: On any occasion during the past 3 months, have you had more than 3 drinks containing alcohol? No    Do you average more than 7 drinks per week? No    Functional Ability and Level of Safety:     Functional Ability:   Does the patient exhibit a steady gait?   yes   How long did it take the patient to get up and walk from a sitting position? 6 sec   Is the patient self reliant?  (ie can do own laundry, meals, household chores)  yes     Does the patient handle his/her own medications? yes     Does the patient handle his/her own money? yes     Is the patients home safe (ie good lighting, handrails on stairs and bath, etc.)? yes     Did you notice or did patient express any hearing difficulties? no     Did you notice or did patient express any vision difficulties? yes  Patient has blurred vision in right eye   Were distance and reading eye charts used? no       Advance Care Planning:   Patient was offered the opportunity to discuss advance care planning:  yes     Does patient have an Advance Directive:  no   If no, did you provide information on Caring Connections? yes     ADL Assessment 6/1/2017   Feeding yourself No Help Needed   Getting from bed to chair No Help Needed   Getting dressed No Help Needed   Bathing or showering No Help Needed   Walk across the room (includes cane/walker) No Help Needed   Using the telphone No Help Needed   Taking your medications No Help Needed   Preparing meals No Help Needed   Managing money (expenses/bills) No Help Needed   Moderately strenuous housework (laundry) No Help Needed   Shopping for personal items (toiletries/medicines) No Help Needed   Shopping for groceries No Help Needed   Driving Help Needed   Climbing a flight of stairs No Help Needed   Getting to places beyond walking distances Help Needed         Hearing Loss   normal-to-mild    Activities of Daily Living   Self-care. Requires assistance with: no ADLs    Fall Risk     Fall Risk Assessment, last 12 mths 3/17/2017   Able to walk? Yes   Fall in past 12 months? No     Abuse Screen   Patient is not abused    Review of Systems   A comprehensive review of systems was negative except for that written in the HPI.     Physical Examination     No exam data present    Evaluation of Cognitive Function:  Mood/affect:  sad  Appearance: age appropriate and within normal Limits  Family member/caregiver input: NA  Clock exam completed and passed    Visit Vitals    BP (!) 152/94 (BP 1 Location: Left arm, BP Patient Position: Sitting)    Pulse 94    Temp 97.6 °F (36.4 °C) (Oral)    Resp 18    Ht 5' 2.5\" (1.588 m)    Wt 132 lb (59.9 kg)    SpO2 98%    BMI 23.76 kg/m2     General:  Alert, cooperative, moderate distress mostly from pain in her back in the thoracic area on the left, appears stated age. Head:  Normocephalic, without obvious abnormality, atraumatic. Eyes:  Conjunctivae/corneas clear. PERRL, EOMs intact. Fundi benign. Ears:  Normal TMs and external ear canals both ears. Nose: Nares normal. Septum midline. Mucosa normal. No drainage or sinus tenderness. Throat: Lips, mucosa, and tongue normal. Teeth and gums normal.   Neck: Supple, symmetrical, trachea midline, no adenopathy, thyroid: no enlargement/tenderness/nodules, no carotid bruit and no JVD. Back:    the patient's back is asymmetrical with the left side having a lumpy area just lateral to the spine where the patient has had a kyphoplasty procedure and in general she has got an exaggerated kyphosis of the thoracic area whereas the lumbar area looks fairly normal.  The previous area of lumpiness in the thoracic spine is exquisitely tender making me think this is metastatic deposit the patient is currently being followed at [facility] but we have not had any communication from them in the past year. Lungs:   Clear to auscultation bilaterally. Chest wall:  No tenderness or deformity. Heart:  Regular rate and rhythm, S1, S2 normal, no murmur, click, rub or gallop. Breast Exam:  . Patient's right breast is still deformed from the breast cancer underneath of it. But from the radiation and chemotherapy she now has exaggerated will follicles which present as black dots were the oral is being oxidized.   The patient is been trying to over scrub this area to clean it out thinking it to divert and I explained to her to quit scrubbing so hard that this was not GERD that it was will glands that were prominent because of the cancer therapy she is undergoing. The left breast is normal at this point in time. In the right breast the cancer is just about taken over the whole breast but after therapy where the area had been exceedingly hard it is now actually softened considerably although the areola and nipple are retracted considerably. There are nodular areas extending out and all directions from the central area of the breast cancer. Abdomen:   Soft, non-tender. Bowel sounds normal. No masses,  No organomegaly. Extremities: Extremities normal, atraumatic, no cyanosis or edema. Pulses: 2+ and symmetric all extremities. Skin: Skin color, texture, turgor normal. No rashes or lesions. Lymph nodes: Cervical, supraclavicular nodes normal.   Neurologic: CNII-XII intact. Normal strength, sensation and reflexes throughout. Patient Care Team:  Isela Varma MD as PCP - [facility])    Advice/Referrals/Counseling   Education and counseling provided:  Are appropriate based on today's review and evaluation  Pneumococcal Vaccine  Bone mass measurement (DEXA)  Screening for glaucoma      Assessment/Plan   I believe the use of the oxycodone and this particular case is necessary to improve the patient's physical and psychosocial function and her quality of life and enable her to carry on her ADLs. This is in addition to just plain being humanitarian and trying to relieve her pain as we cannot cure her illness. We will endeavor to get the treatment records from [facility] and get her back to [facility] for further consultation and appropriate therapy. There are no indications at this point in time of any indicators for medication misuse abuse or diversion. The patient has been made aware of the increased risk of using OxyContin and other agents including gabapentin. The patient is aware that I do query the  and she is happy to give urine specimens when requested. ICD-10-CM ICD-9-CM    1. Routine general medical examination at a health care facility Z00.00 V70.0 REFERRAL TO OPHTHALMOLOGY   2. Screening for alcoholism Z13.89 V79.1    3. Metastatic breast cancer (HCC) C50.919 174.9 oxyCODONE IR (ROXICODONE) 10 mg tab immediate release tablet    C79.9 199.1 gabapentin (NEURONTIN) 600 mg tablet   4. Chronic pain due to neoplasm G89.3 338.3 oxyCODONE IR (ROXICODONE) 10 mg tab immediate release tablet      gabapentin (NEURONTIN) 600 mg tablet   5. Anxiety and depression F41.9 300.00     F32.9 311    .